# Patient Record
Sex: FEMALE | Race: WHITE
[De-identification: names, ages, dates, MRNs, and addresses within clinical notes are randomized per-mention and may not be internally consistent; named-entity substitution may affect disease eponyms.]

---

## 2018-06-05 ENCOUNTER — HOSPITAL ENCOUNTER (EMERGENCY)
Dept: HOSPITAL 82 - ED | Age: 73
Discharge: HOME | End: 2018-06-05
Payer: MEDICARE

## 2018-06-05 VITALS — DIASTOLIC BLOOD PRESSURE: 74 MMHG | SYSTOLIC BLOOD PRESSURE: 123 MMHG

## 2018-06-05 VITALS — WEIGHT: 226.19 LBS | BODY MASS INDEX: 38.62 KG/M2 | HEIGHT: 64 IN
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
## 2018-06-05 DIAGNOSIS — S80.01XA: Primary | ICD-10-CM

## 2018-06-05 DIAGNOSIS — E83.42: ICD-10-CM
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
## 2018-06-05 DIAGNOSIS — S40.011A: ICD-10-CM

## 2018-10-04 ENCOUNTER — HOSPITAL ENCOUNTER (OUTPATIENT)
Dept: HOSPITAL 82 - INF | Age: 73
Discharge: HOME | End: 2018-10-04
Attending: INTERNAL MEDICINE
Payer: MEDICARE

## 2018-10-04 DIAGNOSIS — E83.42: Primary | ICD-10-CM

## 2018-10-04 LAB
MAGNESIUM SERPL-MCNC: 1.4 MG/DL (ref 1.6–2.3)
POTASSIUM SERPL-SCNC: 4.2 MMOL/L (ref 3.5–5.1)

## 2019-02-21 ENCOUNTER — HOSPITAL ENCOUNTER (OUTPATIENT)
Dept: HOSPITAL 82 - INF | Age: 74
Discharge: HOME | End: 2019-02-21
Attending: INTERNAL MEDICINE
Payer: MEDICARE

## 2019-02-21 DIAGNOSIS — E83.42: Primary | ICD-10-CM

## 2019-02-21 LAB
MAGNESIUM SERPL-MCNC: 1.3 MG/DL (ref 1.6–2.3)
POTASSIUM SERPL-SCNC: 3.6 MMOL/L (ref 3.5–5.1)

## 2019-02-25 ENCOUNTER — HOSPITAL ENCOUNTER (OUTPATIENT)
Dept: HOSPITAL 82 - INF | Age: 74
Discharge: HOME | End: 2019-02-25
Attending: NURSE PRACTITIONER
Payer: MEDICARE

## 2019-02-25 DIAGNOSIS — E87.6: ICD-10-CM

## 2019-02-25 DIAGNOSIS — E83.42: Primary | ICD-10-CM

## 2019-02-25 LAB
ANION GAP SERPL CALCULATED.3IONS-SCNC: 14 MMOL/L
BUN SERPL-MCNC: 14 MG/DL (ref 8–23)
BUN/CREAT SERPL: 11
CHLORIDE SERPL-SCNC: 104 MMOL/L (ref 95–108)
CO2 SERPL-SCNC: 26 MMOL/L (ref 22–30)
CREAT SERPL-MCNC: 1.3 MG/DL (ref 0.5–1)
MAGNESIUM SERPL-MCNC: 1.4 MG/DL (ref 1.6–2.3)
POTASSIUM SERPL-SCNC: 3.8 MMOL/L (ref 3.5–5.1)
SODIUM SERPL-SCNC: 140 MMOL/L (ref 137–146)

## 2019-02-27 ENCOUNTER — HOSPITAL ENCOUNTER (OUTPATIENT)
Dept: HOSPITAL 82 - DI | Age: 74
Discharge: HOME | End: 2019-02-27
Attending: NURSE PRACTITIONER
Payer: MEDICARE

## 2019-02-27 DIAGNOSIS — R05: Primary | ICD-10-CM

## 2019-02-27 DIAGNOSIS — R06.2: ICD-10-CM

## 2019-03-04 ENCOUNTER — HOSPITAL ENCOUNTER (OUTPATIENT)
Dept: HOSPITAL 82 - LAB | Age: 74
Discharge: HOME | End: 2019-03-04
Attending: INTERNAL MEDICINE
Payer: MEDICARE

## 2019-03-04 DIAGNOSIS — E83.42: ICD-10-CM

## 2019-03-04 DIAGNOSIS — N18.2: Primary | ICD-10-CM

## 2019-04-17 ENCOUNTER — HOSPITAL ENCOUNTER (OUTPATIENT)
Dept: HOSPITAL 82 - ENDO | Age: 74
Discharge: HOME | End: 2019-04-17
Attending: SURGERY
Payer: MEDICARE

## 2019-04-17 VITALS — SYSTOLIC BLOOD PRESSURE: 108 MMHG | DIASTOLIC BLOOD PRESSURE: 57 MMHG

## 2019-04-17 DIAGNOSIS — Z12.11: Primary | ICD-10-CM

## 2019-04-17 DIAGNOSIS — K64.4: ICD-10-CM

## 2019-04-17 DIAGNOSIS — K56.609: ICD-10-CM

## 2019-04-17 PROCEDURE — 0DJD8ZZ INSPECTION OF LOWER INTESTINAL TRACT, VIA NATURAL OR ARTIFICIAL OPENING ENDOSCOPIC: ICD-10-PCS | Performed by: SURGERY

## 2020-05-26 ENCOUNTER — HOSPITAL ENCOUNTER (EMERGENCY)
Dept: HOSPITAL 82 - ED | Age: 75
Discharge: HOME | End: 2020-05-26
Payer: MEDICARE

## 2020-05-26 VITALS — SYSTOLIC BLOOD PRESSURE: 135 MMHG | DIASTOLIC BLOOD PRESSURE: 71 MMHG

## 2020-05-26 DIAGNOSIS — I10: ICD-10-CM

## 2020-05-26 DIAGNOSIS — M25.532: ICD-10-CM

## 2020-05-26 DIAGNOSIS — M79.642: Primary | ICD-10-CM

## 2020-05-26 DIAGNOSIS — M79.89: ICD-10-CM

## 2022-06-03 ENCOUNTER — HOSPITAL ENCOUNTER (OUTPATIENT)
Dept: HOSPITAL 82 - ED | Age: 77
Setting detail: OBSERVATION
LOS: 2 days | Discharge: HOME | End: 2022-06-05
Attending: INTERNAL MEDICINE | Admitting: INTERNAL MEDICINE
Payer: MEDICARE

## 2022-06-03 VITALS — DIASTOLIC BLOOD PRESSURE: 64 MMHG | SYSTOLIC BLOOD PRESSURE: 136 MMHG

## 2022-06-03 VITALS — SYSTOLIC BLOOD PRESSURE: 98 MMHG | DIASTOLIC BLOOD PRESSURE: 49 MMHG

## 2022-06-03 VITALS — HEIGHT: 64 IN | WEIGHT: 194.01 LBS | BODY MASS INDEX: 33.12 KG/M2

## 2022-06-03 VITALS — DIASTOLIC BLOOD PRESSURE: 66 MMHG | SYSTOLIC BLOOD PRESSURE: 136 MMHG

## 2022-06-03 VITALS — SYSTOLIC BLOOD PRESSURE: 149 MMHG | DIASTOLIC BLOOD PRESSURE: 60 MMHG

## 2022-06-03 VITALS — SYSTOLIC BLOOD PRESSURE: 136 MMHG | DIASTOLIC BLOOD PRESSURE: 66 MMHG

## 2022-06-03 VITALS — DIASTOLIC BLOOD PRESSURE: 57 MMHG | SYSTOLIC BLOOD PRESSURE: 124 MMHG

## 2022-06-03 DIAGNOSIS — D68.32: ICD-10-CM

## 2022-06-03 DIAGNOSIS — Z86.73: ICD-10-CM

## 2022-06-03 DIAGNOSIS — F32.A: ICD-10-CM

## 2022-06-03 DIAGNOSIS — Z86.718: ICD-10-CM

## 2022-06-03 DIAGNOSIS — T45.515A: ICD-10-CM

## 2022-06-03 DIAGNOSIS — I10: ICD-10-CM

## 2022-06-03 DIAGNOSIS — K21.9: ICD-10-CM

## 2022-06-03 DIAGNOSIS — Z86.711: ICD-10-CM

## 2022-06-03 DIAGNOSIS — R31.9: Primary | ICD-10-CM

## 2022-06-03 DIAGNOSIS — Z20.822: ICD-10-CM

## 2022-06-03 LAB
ALBUMIN SERPL-MCNC: 3.5 G/DL (ref 3.2–5)
ALP SERPL-CCNC: 108 U/L (ref 38–126)
ANION GAP SERPL CALCULATED.3IONS-SCNC: 13 MMOL/L
AST SERPL-CCNC: 29 U/L (ref 9–36)
BASOPHILS NFR BLD AUTO: 1 % (ref 0–3)
BILIRUB UR QL STRIP.AUTO: NEGATIVE
BUN SERPL-MCNC: 17 MG/DL (ref 8–23)
BUN/CREAT SERPL: 16
CHLORIDE SERPL-SCNC: 110 MMOL/L (ref 95–108)
CO2 SERPL-SCNC: 22 MMOL/L (ref 22–30)
COARSE GRAN CASTS URNS QL MICRO: (no result) LPF
COLOR UR AUTO: (no result)
CREAT SERPL-MCNC: 1 MG/DL (ref 0.5–1)
EOSINOPHIL NFR BLD AUTO: 8 % (ref 0–8)
ERYTHROCYTE [DISTWIDTH] IN BLOOD BY AUTOMATED COUNT: 13.3 % (ref 11.5–15.5)
GLUCOSE UR STRIP.AUTO-MCNC: NEGATIVE MG/DL
HCT VFR BLD AUTO: 37.5 % (ref 37–47)
HGB BLD-MCNC: 12.1 G/DL (ref 12–16)
HGB UR QL STRIP.AUTO: (no result)
IMM GRANULOCYTES NFR BLD: 0 % (ref 0–5)
INR PPP: 4 RATIO (ref 0.7–1.3)
KETONES UR STRIP.AUTO-MCNC: NEGATIVE MG/DL
LEUKOCYTE ESTERASE UR QL STRIP.AUTO: NEGATIVE
LYMPHOCYTES NFR BLD: 22 % (ref 15–41)
MCH RBC QN AUTO: 32.9 PG  CALC (ref 26–32)
MCHC RBC AUTO-ENTMCNC: 32.3 G/DL CAL (ref 32–36)
MCV RBC AUTO: 101.9 FL  CALC (ref 80–100)
MONOCYTES NFR BLD AUTO: 7 % (ref 2–13)
NEUTROPHILS # BLD AUTO: 3.09 THOU/UL (ref 2–7.15)
NEUTROPHILS NFR BLD AUTO: 62 % (ref 42–76)
NITRITE UR QL STRIP.AUTO: NEGATIVE
PH UR STRIP.AUTO: 6 [PH] (ref 4.5–8)
PLATELET # BLD AUTO: 185 THOU/UL (ref 130–400)
POTASSIUM SERPL-SCNC: 3.5 MMOL/L (ref 3.5–5.1)
PROT SERPL-MCNC: 6.9 G/DL (ref 6.3–8.2)
PROT UR QL STRIP.AUTO: >=300 MG/DL
PROTHROMBIN TIME: 38.6 SECONDS (ref 9–12.5)
RBC # BLD AUTO: 3.68 MILL/UL (ref 4.2–5.6)
RBC #/AREA URNS HPF: >100 RBC/HPF (ref 0–5)
SODIUM SERPL-SCNC: 141 MMOL/L (ref 137–146)
SP GR UR STRIP.AUTO: >=1.03
UROBILINOGEN UR QL STRIP.AUTO: 0.2 E.U./DL
WBC #/AREA URNS HPF: (no result) WBC/HPF (ref 0–5)

## 2022-06-03 PROCEDURE — G0378 HOSPITAL OBSERVATION PER HR: HCPCS

## 2022-06-04 VITALS — DIASTOLIC BLOOD PRESSURE: 44 MMHG | SYSTOLIC BLOOD PRESSURE: 105 MMHG

## 2022-06-04 VITALS — DIASTOLIC BLOOD PRESSURE: 42 MMHG | SYSTOLIC BLOOD PRESSURE: 102 MMHG

## 2022-06-04 VITALS — SYSTOLIC BLOOD PRESSURE: 93 MMHG | DIASTOLIC BLOOD PRESSURE: 44 MMHG

## 2022-06-04 VITALS — DIASTOLIC BLOOD PRESSURE: 53 MMHG | SYSTOLIC BLOOD PRESSURE: 110 MMHG

## 2022-06-04 VITALS — SYSTOLIC BLOOD PRESSURE: 96 MMHG | DIASTOLIC BLOOD PRESSURE: 45 MMHG

## 2022-06-04 VITALS — DIASTOLIC BLOOD PRESSURE: 53 MMHG | SYSTOLIC BLOOD PRESSURE: 111 MMHG

## 2022-06-04 LAB
ANION GAP SERPL CALCULATED.3IONS-SCNC: 9 MMOL/L
BUN SERPL-MCNC: 17 MG/DL (ref 8–23)
BUN/CREAT SERPL: 20
CHLORIDE SERPL-SCNC: 112 MMOL/L (ref 95–108)
CO2 SERPL-SCNC: 24 MMOL/L (ref 22–30)
CREAT SERPL-MCNC: 0.9 MG/DL (ref 0.5–1)
ERYTHROCYTE [DISTWIDTH] IN BLOOD BY AUTOMATED COUNT: 13.4 % (ref 11.5–15.5)
HCT VFR BLD AUTO: 32.4 % (ref 37–47)
HGB BLD-MCNC: 10.7 G/DL (ref 12–16)
INR PPP: 3.2 RATIO (ref 0.7–1.3)
MCH RBC QN AUTO: 33.9 PG  CALC (ref 26–32)
MCHC RBC AUTO-ENTMCNC: 33 G/DL CAL (ref 32–36)
MCV RBC AUTO: 102.5 FL  CALC (ref 80–100)
PLATELET # BLD AUTO: 131 THOU/UL (ref 130–400)
POTASSIUM SERPL-SCNC: 3.6 MMOL/L (ref 3.5–5.1)
PROTHROMBIN TIME: 31.4 SECONDS (ref 9–12.5)
RBC # BLD AUTO: 3.16 MILL/UL (ref 4.2–5.6)
SODIUM SERPL-SCNC: 141 MMOL/L (ref 137–146)

## 2022-06-05 VITALS — DIASTOLIC BLOOD PRESSURE: 45 MMHG | SYSTOLIC BLOOD PRESSURE: 91 MMHG

## 2022-06-05 VITALS — SYSTOLIC BLOOD PRESSURE: 92 MMHG | DIASTOLIC BLOOD PRESSURE: 37 MMHG

## 2022-06-05 VITALS — DIASTOLIC BLOOD PRESSURE: 60 MMHG | SYSTOLIC BLOOD PRESSURE: 119 MMHG

## 2022-06-05 VITALS — SYSTOLIC BLOOD PRESSURE: 109 MMHG | DIASTOLIC BLOOD PRESSURE: 52 MMHG

## 2022-06-05 LAB
ALBUMIN SERPL-MCNC: 2.4 G/DL (ref 3.2–5)
ALP SERPL-CCNC: 89 U/L (ref 38–126)
ANION GAP SERPL CALCULATED.3IONS-SCNC: 9 MMOL/L
AST SERPL-CCNC: 22 U/L (ref 9–36)
BASOPHILS NFR BLD AUTO: 1 % (ref 0–3)
BUN SERPL-MCNC: 16 MG/DL (ref 8–23)
BUN/CREAT SERPL: 19
CHLORIDE SERPL-SCNC: 113 MMOL/L (ref 95–108)
CO2 SERPL-SCNC: 22 MMOL/L (ref 22–30)
CREAT SERPL-MCNC: 0.9 MG/DL (ref 0.5–1)
EOSINOPHIL NFR BLD AUTO: 11 % (ref 0–8)
ERYTHROCYTE [DISTWIDTH] IN BLOOD BY AUTOMATED COUNT: 13.5 % (ref 11.5–15.5)
HCT VFR BLD AUTO: 30.1 % (ref 37–47)
HGB BLD-MCNC: 9.5 G/DL (ref 12–16)
IMM GRANULOCYTES NFR BLD: 0 % (ref 0–5)
INR PPP: 2 RATIO (ref 0.7–1.3)
INR PPP: 2.2 RATIO (ref 0.7–1.3)
LYMPHOCYTES NFR BLD: 30 % (ref 15–41)
MCH RBC QN AUTO: 33 PG  CALC (ref 26–32)
MCHC RBC AUTO-ENTMCNC: 31.6 G/DL CAL (ref 32–36)
MCV RBC AUTO: 104.5 FL  CALC (ref 80–100)
MONOCYTES NFR BLD AUTO: 11 % (ref 2–13)
NEUTROPHILS # BLD AUTO: 1.63 THOU/UL (ref 2–7.15)
NEUTROPHILS NFR BLD AUTO: 48 % (ref 42–76)
PLATELET # BLD AUTO: 121 THOU/UL (ref 130–400)
POTASSIUM SERPL-SCNC: 3.6 MMOL/L (ref 3.5–5.1)
PROT SERPL-MCNC: 5.2 G/DL (ref 6.3–8.2)
PROTHROMBIN TIME: 20.3 SECONDS (ref 9–12.5)
PROTHROMBIN TIME: 21.9 SECONDS (ref 9–12.5)
RBC # BLD AUTO: 2.88 MILL/UL (ref 4.2–5.6)
SODIUM SERPL-SCNC: 141 MMOL/L (ref 137–146)

## 2022-11-23 ENCOUNTER — HOSPITAL ENCOUNTER (INPATIENT)
Dept: HOSPITAL 82 - ED | Age: 77
LOS: 12 days | Discharge: NURSING, REHAB OR LONG-TERM CARE | DRG: 155 | End: 2022-12-05
Attending: INTERNAL MEDICINE | Admitting: INTERNAL MEDICINE
Payer: MEDICARE

## 2022-11-23 VITALS — DIASTOLIC BLOOD PRESSURE: 51 MMHG | SYSTOLIC BLOOD PRESSURE: 102 MMHG

## 2022-11-23 VITALS — SYSTOLIC BLOOD PRESSURE: 103 MMHG | DIASTOLIC BLOOD PRESSURE: 82 MMHG

## 2022-11-23 VITALS — DIASTOLIC BLOOD PRESSURE: 41 MMHG | SYSTOLIC BLOOD PRESSURE: 88 MMHG

## 2022-11-23 VITALS — DIASTOLIC BLOOD PRESSURE: 59 MMHG | SYSTOLIC BLOOD PRESSURE: 115 MMHG

## 2022-11-23 VITALS — SYSTOLIC BLOOD PRESSURE: 117 MMHG | DIASTOLIC BLOOD PRESSURE: 52 MMHG

## 2022-11-23 VITALS — SYSTOLIC BLOOD PRESSURE: 143 MMHG | DIASTOLIC BLOOD PRESSURE: 73 MMHG

## 2022-11-23 VITALS — SYSTOLIC BLOOD PRESSURE: 116 MMHG | DIASTOLIC BLOOD PRESSURE: 56 MMHG

## 2022-11-23 VITALS — BODY MASS INDEX: 31.62 KG/M2 | WEIGHT: 185.19 LBS | HEIGHT: 64 IN

## 2022-11-23 DIAGNOSIS — R79.1: ICD-10-CM

## 2022-11-23 DIAGNOSIS — B95.61: ICD-10-CM

## 2022-11-23 DIAGNOSIS — K11.21: Primary | ICD-10-CM

## 2022-11-23 DIAGNOSIS — E87.20: ICD-10-CM

## 2022-11-23 DIAGNOSIS — Z86.73: ICD-10-CM

## 2022-11-23 DIAGNOSIS — L03.211: ICD-10-CM

## 2022-11-23 DIAGNOSIS — Z79.01: ICD-10-CM

## 2022-11-23 DIAGNOSIS — I10: ICD-10-CM

## 2022-11-23 DIAGNOSIS — R78.81: ICD-10-CM

## 2022-11-23 DIAGNOSIS — M19.90: ICD-10-CM

## 2022-11-23 DIAGNOSIS — Z86.718: ICD-10-CM

## 2022-11-23 DIAGNOSIS — Z86.711: ICD-10-CM

## 2022-11-23 DIAGNOSIS — K21.9: ICD-10-CM

## 2022-11-23 DIAGNOSIS — T45.515A: ICD-10-CM

## 2022-11-23 DIAGNOSIS — F32.A: ICD-10-CM

## 2022-11-23 LAB
ALBUMIN SERPL-MCNC: 2.8 G/DL (ref 3.2–5)
ALP SERPL-CCNC: 159 U/L (ref 38–126)
ANION GAP SERPL CALCULATED.3IONS-SCNC: 10 MMOL/L
AST SERPL-CCNC: 38 U/L (ref 9–36)
BASOPHILS NFR BLD AUTO: 0 % (ref 0–3)
BUN SERPL-MCNC: 12 MG/DL (ref 8–23)
BUN/CREAT SERPL: 12
CHLORIDE SERPL-SCNC: 104 MMOL/L (ref 95–108)
CO2 SERPL-SCNC: 25 MMOL/L (ref 22–30)
CREAT SERPL-MCNC: 1 MG/DL (ref 0.5–1)
EOSINOPHIL NFR BLD AUTO: 0 % (ref 0–8)
ERYTHROCYTE [DISTWIDTH] IN BLOOD BY AUTOMATED COUNT: 15 % (ref 11.5–15.5)
HCT VFR BLD AUTO: 31.7 % (ref 37–47)
HGB BLD-MCNC: 10.7 G/DL (ref 12–16)
IMM GRANULOCYTES NFR BLD: 0.3 % (ref 0–5)
LYMPHOCYTES NFR BLD: 5 % (ref 15–41)
MCH RBC QN AUTO: 33.4 PG  CALC (ref 26–32)
MCHC RBC AUTO-ENTMCNC: 33.8 G/DL CAL (ref 32–36)
MCV RBC AUTO: 99.1 FL  CALC (ref 80–100)
MONOCYTES NFR BLD AUTO: 10 % (ref 2–13)
NEUTROPHILS # BLD AUTO: 12.35 THOU/UL (ref 2–7.15)
NEUTROPHILS NFR BLD AUTO: 85 % (ref 42–76)
PLATELET # BLD AUTO: 183 THOU/UL (ref 130–400)
POTASSIUM SERPL-SCNC: 3.4 MMOL/L (ref 3.5–5.1)
PROT SERPL-MCNC: 6.7 G/DL (ref 6.3–8.2)
RBC # BLD AUTO: 3.2 MILL/UL (ref 4.2–5.6)
SODIUM SERPL-SCNC: 136 MMOL/L (ref 137–146)

## 2022-11-23 PROCEDURE — G0378 HOSPITAL OBSERVATION PER HR: HCPCS

## 2022-11-23 NOTE — NUR
PT BROUGHT TO [facility] IN STABLE CONDITION, NOT WEARING O2. BREATHING IS EVEN AND
NON-LABORED, NO C/O SOB OR BREATHING DIFFICULTIES. PT ABLE TO AMBULATE WITH
SUPERVISION, MINIMUM ASISTANCE.  PT DENIES ANY PAIN OR DISCOMFORT. IV SITE
FLUSHED, PATENT. PT ORIEBTATED TO ROOM, CALL SYSTEM AND ENCOURAGED TO USE CALL
LIGHT. TELE MONITOR IN PLACE. PT DENIES ANY NEEDS AT THIS TIME. ALL SAFETY
PRECAUTIONS IN PLACE AT THIS TIME.

## 2022-11-24 VITALS — DIASTOLIC BLOOD PRESSURE: 53 MMHG | SYSTOLIC BLOOD PRESSURE: 106 MMHG

## 2022-11-24 VITALS — DIASTOLIC BLOOD PRESSURE: 50 MMHG | SYSTOLIC BLOOD PRESSURE: 100 MMHG

## 2022-11-24 VITALS — SYSTOLIC BLOOD PRESSURE: 90 MMHG | DIASTOLIC BLOOD PRESSURE: 50 MMHG

## 2022-11-24 VITALS — DIASTOLIC BLOOD PRESSURE: 50 MMHG | SYSTOLIC BLOOD PRESSURE: 95 MMHG

## 2022-11-24 VITALS — SYSTOLIC BLOOD PRESSURE: 83 MMHG | DIASTOLIC BLOOD PRESSURE: 42 MMHG

## 2022-11-24 VITALS — SYSTOLIC BLOOD PRESSURE: 100 MMHG | DIASTOLIC BLOOD PRESSURE: 50 MMHG

## 2022-11-24 VITALS — DIASTOLIC BLOOD PRESSURE: 61 MMHG | SYSTOLIC BLOOD PRESSURE: 111 MMHG

## 2022-11-24 LAB
ANION GAP SERPL CALCULATED.3IONS-SCNC: 13 MMOL/L
BILIRUB UR QL STRIP.AUTO: NEGATIVE
BUN SERPL-MCNC: 13 MG/DL (ref 8–23)
BUN/CREAT SERPL: 13
CHLORIDE SERPL-SCNC: 108 MMOL/L (ref 95–108)
CO2 SERPL-SCNC: 23 MMOL/L (ref 22–30)
COLOR UR AUTO: (no result)
CREAT SERPL-MCNC: 1 MG/DL (ref 0.5–1)
ERYTHROCYTE [DISTWIDTH] IN BLOOD BY AUTOMATED COUNT: 14.8 % (ref 11.5–15.5)
GLUCOSE UR STRIP.AUTO-MCNC: NEGATIVE MG/DL
HCT VFR BLD AUTO: 30.7 % (ref 37–47)
HGB BLD-MCNC: 10.2 G/DL (ref 12–16)
HGB UR QL STRIP.AUTO: NEGATIVE
INR PPP: 4.5 RATIO (ref 0.7–1.3)
KETONES UR STRIP.AUTO-MCNC: (no result) MG/DL
LEUKOCYTE ESTERASE UR QL STRIP.AUTO: NEGATIVE
MCH RBC QN AUTO: 33 PG  CALC (ref 26–32)
MCHC RBC AUTO-ENTMCNC: 33.2 G/DL CAL (ref 32–36)
MCV RBC AUTO: 99.4 FL  CALC (ref 80–100)
NITRITE UR QL STRIP.AUTO: NEGATIVE
PH UR STRIP.AUTO: 6 [PH] (ref 4.5–8)
PLATELET # BLD AUTO: 137 THOU/UL (ref 130–400)
POTASSIUM SERPL-SCNC: 3.5 MMOL/L (ref 3.5–5.1)
PROT UR QL STRIP.AUTO: 30 MG/DL
PROTHROMBIN TIME: 41.7 SECONDS (ref 9–12.5)
RBC # BLD AUTO: 3.09 MILL/UL (ref 4.2–5.6)
RBC #/AREA URNS HPF: (no result) RBC/HPF (ref 0–5)
SODIUM SERPL-SCNC: 140 MMOL/L (ref 137–146)
SP GR UR STRIP.AUTO: 1.02
SQUAMOUS URNS QL MICRO: (no result) EPI/HPF
UROBILINOGEN UR QL STRIP.AUTO: 0.2 E.U./DL
WBC #/AREA URNS HPF: (no result) WBC/HPF (ref 0–5)

## 2022-11-24 NOTE — NUR
PATIENT RESTING QUIETLY IN BED. ALERT AND ORIENTED X3. ASSESSMENT COMPLETE, PM
MEDS ADMINISTERED. C/O MILD PAIN AS 2/10, DECLINES OFFER OF TYLENOL. CALL
LIGHT WITHIN REACH, INSTRUCTED TO CALL FOR ASSISTANCE.

## 2022-11-24 NOTE — NUR
PT SLEEPING IN BED EASILY AROUSIBLE. PT SLIGHTLY CONFUSED WHEN WOKEN UP BUT
EASILY REORIENTATED. PT BREATHING EVEN AND NON LABORED. IV SITE PATENT. PT
DENIES NEEDING TO USE RESTROOM, NEEDING A DRINK/SNACK AND DENIES PAIN. DENIES
ANY FURTHER NEEDS. ALL SAFETY PRECAUTIONS IN PLACE AT THIS TIME

## 2022-11-24 NOTE — NUR
PT IN ROOM SLEEPING, AROUSED TO TOUCH. PT BREATHING REMAINS THE SAME. PT
DENIES ANY NEEDS AT THIS TIME. TELE MONITOR IN PLACE. ALL SAFETY PRECAUTIONS
IN PLACE AT THIS TIME.

## 2022-11-24 NOTE — NUR
BEDSIDE SHIFT REPORT, PT AWAKE ALERT AND ORIENTED RESTING IN BED, RIGHT FACE
SWOLEN AND HARD, C/O MILD DISCOMFORT AT THIS TIME TO AREA, TELE MONITOR IN
PLACE, CALL BELL IN REACH AND BED LOCKED IN LOWEST POSITION.

## 2022-11-24 NOTE — NUR
HIGH LABS RECIEVED FROM [facility] IN LAB, INR 4.5 AND PT 41.7. INFORMED ON CALL
PHYSICAN AND WAS TOLD TO HOLD WARFARIN. WILL PASS ON IN REPORT.

## 2022-11-24 NOTE — NUR
S:  MILLA LAY is a 77 F who presents with
CELLULITIS .
 
She has a history of SSTI. All medications in patient's
chart were reviewed.
 
O:
 
VS: BP 90/50, P 76, RR 17,T 98.1
 
W 84KG, HT 64IN,CrCl= 64ml/min
 
A:
 
Blood culture is pending
 
Urine culture is pending
 
P:
 
Vancomycin ordered for pharmacy to dose.
 
Start Vancomycin 1 GRAM IV Q12H. Vancomycin trough is drawn before the 4th
dose on 11/25/22 @0800.
 
Vancomycin goal trough is between <15-20 mcg/ml>.
 
Pharmacy will follow and or advise on antibiotics use as needed.
 
 
NELLY FONTANAD

## 2022-11-24 NOTE — NUR
CHECKED ON PT DUE TO LOW BLOOD PRESSURE. PT EASILY ARROUSED, SPEECH CLEAR,
A/OX3. PT DID STAY AWAKE FOR SMALL CONVERSATION. PT DENIES ANY ODD FEELING OR
PAIN. PT DENIES ANY  NEEDS AT THIS TIME.

## 2022-11-25 VITALS — SYSTOLIC BLOOD PRESSURE: 100 MMHG | DIASTOLIC BLOOD PRESSURE: 43 MMHG

## 2022-11-25 VITALS — SYSTOLIC BLOOD PRESSURE: 94 MMHG | DIASTOLIC BLOOD PRESSURE: 43 MMHG

## 2022-11-25 VITALS — DIASTOLIC BLOOD PRESSURE: 54 MMHG | SYSTOLIC BLOOD PRESSURE: 110 MMHG

## 2022-11-25 VITALS — SYSTOLIC BLOOD PRESSURE: 99 MMHG | DIASTOLIC BLOOD PRESSURE: 51 MMHG

## 2022-11-25 VITALS — DIASTOLIC BLOOD PRESSURE: 52 MMHG | SYSTOLIC BLOOD PRESSURE: 104 MMHG

## 2022-11-25 VITALS — SYSTOLIC BLOOD PRESSURE: 90 MMHG | DIASTOLIC BLOOD PRESSURE: 51 MMHG

## 2022-11-25 VITALS — DIASTOLIC BLOOD PRESSURE: 51 MMHG | SYSTOLIC BLOOD PRESSURE: 97 MMHG

## 2022-11-25 VITALS — SYSTOLIC BLOOD PRESSURE: 110 MMHG | DIASTOLIC BLOOD PRESSURE: 54 MMHG

## 2022-11-25 LAB
ANION GAP SERPL CALCULATED.3IONS-SCNC: 8 MMOL/L
BUN SERPL-MCNC: 13 MG/DL (ref 8–23)
BUN/CREAT SERPL: 14
CHLORIDE SERPL-SCNC: 111 MMOL/L (ref 95–108)
CO2 SERPL-SCNC: 22 MMOL/L (ref 22–30)
CREAT SERPL-MCNC: 0.9 MG/DL (ref 0.5–1)
ERYTHROCYTE [DISTWIDTH] IN BLOOD BY AUTOMATED COUNT: 15.1 % (ref 11.5–15.5)
HCT VFR BLD AUTO: 26.4 % (ref 37–47)
HGB BLD-MCNC: 8.8 G/DL (ref 12–16)
INR PPP: 4.4 RATIO (ref 0.7–1.3)
MAGNESIUM SERPL-MCNC: 0.9 MG/DL (ref 1.6–2.3)
MCH RBC QN AUTO: 32.8 PG  CALC (ref 26–32)
MCHC RBC AUTO-ENTMCNC: 33.3 G/DL CAL (ref 32–36)
MCV RBC AUTO: 98.5 FL  CALC (ref 80–100)
PLATELET # BLD AUTO: 119 THOU/UL (ref 130–400)
POTASSIUM SERPL-SCNC: 3.2 MMOL/L (ref 3.5–5.1)
PROTHROMBIN TIME: 41.2 SECONDS (ref 9–12.5)
RBC # BLD AUTO: 2.68 MILL/UL (ref 4.2–5.6)
SODIUM SERPL-SCNC: 137 MMOL/L (ref 137–146)

## 2022-11-25 NOTE — NUR
BEDSIDE REPORT GIVEN, PT SLEEPING BUT AROUSES TO VERBAL STIMULI, C/O MILD PAIN
TO RIGHT FACE, TELE MONITORIN PLACE, CALL CORONA IN REACH AND BED LOCKED IN
LOWEST POSITION.

## 2022-11-25 NOTE — NUR
PRELIMINARY BC RESULTS SHOW GRAM (+) COCCI IN 1/4 VIALS. RESULTS REPORTED TO
DR MATTHEWS. PATIENT CURRENTLY RECEIVING VANCOMYCIN 1GM IV Q12H. NO NEW ORDERS.

## 2022-11-25 NOTE — NUR
S:  MILLA LAY is a 77 F who presents with CELLUITIS OF FACE
 
She has a history of HYPERTENSION, GERD, DEPRESSION, HYPOKALEMIA, AND
HYPOMAGNESIA.
 
All medications in patient's
chart were reviewed.
 
O:
 
VS: BP 99/51mmHg, P 75bpm, RR 18bpm,T 96.5F
 
W 84kg, HT 64inches, Scr=0.9mg/dL,CrCl= 51ml/min
 
A:
 
Blood culture (preliminary) shows 1/4 gram positive cocci.
 
P:
 
Patient is on ZOYSN 3.375g IV Q6H.
 
Vancomycin ordered for pharmacy to dose.
 
CONTINUE Vancomycin 1g IV Q12H. Vancomycin trough is drawn before the 4th dose
on 11/26/2022 @ 20:00.
 
Vancomycin goal trough is between <10-15 mcg/ml>.
 
Pharmacy will follow and or advise on antibiotics use as needed.

## 2022-11-26 VITALS — DIASTOLIC BLOOD PRESSURE: 55 MMHG | SYSTOLIC BLOOD PRESSURE: 111 MMHG

## 2022-11-26 VITALS — DIASTOLIC BLOOD PRESSURE: 43 MMHG | SYSTOLIC BLOOD PRESSURE: 97 MMHG

## 2022-11-26 VITALS — DIASTOLIC BLOOD PRESSURE: 53 MMHG | SYSTOLIC BLOOD PRESSURE: 102 MMHG

## 2022-11-26 VITALS — DIASTOLIC BLOOD PRESSURE: 61 MMHG | SYSTOLIC BLOOD PRESSURE: 127 MMHG

## 2022-11-26 VITALS — SYSTOLIC BLOOD PRESSURE: 92 MMHG | DIASTOLIC BLOOD PRESSURE: 52 MMHG

## 2022-11-26 LAB
ANION GAP SERPL CALCULATED.3IONS-SCNC: 7 MMOL/L
BUN SERPL-MCNC: 13 MG/DL (ref 8–23)
BUN/CREAT SERPL: 14
CHLORIDE SERPL-SCNC: 110 MMOL/L (ref 95–108)
CO2 SERPL-SCNC: 25 MMOL/L (ref 22–30)
CREAT SERPL-MCNC: 0.9 MG/DL (ref 0.5–1)
ERYTHROCYTE [DISTWIDTH] IN BLOOD BY AUTOMATED COUNT: 15.3 % (ref 11.5–15.5)
HCT VFR BLD AUTO: 25.5 % (ref 37–47)
HGB BLD-MCNC: 8.6 G/DL (ref 12–16)
INR PPP: 3.5 RATIO (ref 0.7–1.3)
MAGNESIUM SERPL-MCNC: 1.7 MG/DL (ref 1.6–2.3)
MCH RBC QN AUTO: 33.2 PG  CALC (ref 26–32)
MCHC RBC AUTO-ENTMCNC: 33.7 G/DL CAL (ref 32–36)
MCV RBC AUTO: 98.5 FL  CALC (ref 80–100)
PLATELET # BLD AUTO: 135 THOU/UL (ref 130–400)
POTASSIUM SERPL-SCNC: 3.5 MMOL/L (ref 3.5–5.1)
PROTHROMBIN TIME: 33.2 SECONDS (ref 9–12.5)
RBC # BLD AUTO: 2.59 MILL/UL (ref 4.2–5.6)
SODIUM SERPL-SCNC: 138 MMOL/L (ref 137–146)

## 2022-11-26 NOTE — NUR
PT IN BED RESTING WATCHING TV BREATHING EVEN UNLABORED. NO S/S OF DISTRESS
NOTED. ASSESMENT COMPLETED. DENIES PAIN OR DISCOMFORT. CALL LIGHT IN REACH
AND BED IN LOWEST POSITION.

## 2022-11-26 NOTE — NUR
RECEIVED REPORT FROM OFF GOING NURSE. AWAKE IN BED. AM IV ABT INFUSION
COMPLETED. NO S/S OF ADVERSE REACTIONS NOTED.

## 2022-11-27 VITALS — DIASTOLIC BLOOD PRESSURE: 58 MMHG | SYSTOLIC BLOOD PRESSURE: 113 MMHG

## 2022-11-27 VITALS — SYSTOLIC BLOOD PRESSURE: 106 MMHG | DIASTOLIC BLOOD PRESSURE: 57 MMHG

## 2022-11-27 VITALS — DIASTOLIC BLOOD PRESSURE: 53 MMHG | SYSTOLIC BLOOD PRESSURE: 107 MMHG

## 2022-11-27 VITALS — DIASTOLIC BLOOD PRESSURE: 50 MMHG | SYSTOLIC BLOOD PRESSURE: 105 MMHG

## 2022-11-27 VITALS — DIASTOLIC BLOOD PRESSURE: 49 MMHG | SYSTOLIC BLOOD PRESSURE: 99 MMHG

## 2022-11-27 LAB
ANION GAP SERPL CALCULATED.3IONS-SCNC: 5 MMOL/L
BUN SERPL-MCNC: 11 MG/DL (ref 8–23)
BUN/CREAT SERPL: 12
CHLORIDE SERPL-SCNC: 110 MMOL/L (ref 95–108)
CO2 SERPL-SCNC: 26 MMOL/L (ref 22–30)
CREAT SERPL-MCNC: 0.9 MG/DL (ref 0.5–1)
ERYTHROCYTE [DISTWIDTH] IN BLOOD BY AUTOMATED COUNT: 15.2 % (ref 11.5–15.5)
HCT VFR BLD AUTO: 27.6 % (ref 37–47)
HGB BLD-MCNC: 9.2 G/DL (ref 12–16)
INR PPP: 3.3 RATIO (ref 0.7–1.3)
MAGNESIUM SERPL-MCNC: 1.5 MG/DL (ref 1.6–2.3)
MCH RBC QN AUTO: 33.3 PG  CALC (ref 26–32)
MCHC RBC AUTO-ENTMCNC: 33.3 G/DL CAL (ref 32–36)
MCV RBC AUTO: 100 FL  CALC (ref 80–100)
PLATELET # BLD AUTO: 144 THOU/UL (ref 130–400)
POTASSIUM SERPL-SCNC: 3.3 MMOL/L (ref 3.5–5.1)
PROTHROMBIN TIME: 31.4 SECONDS (ref 9–12.5)
RBC # BLD AUTO: 2.76 MILL/UL (ref 4.2–5.6)
SODIUM SERPL-SCNC: 138 MMOL/L (ref 137–146)

## 2022-11-27 NOTE — NUR
PT IN BED WATCHIN TV.NO S/S OF DISTRESS NOTED. DENIES PAIN AT THIS TIME.
ASSESMENT COMPPLETED. CALL LIGHT IN REACH AND BE DIN LOWEST POSITION.

## 2022-11-27 NOTE — NUR
PATIENT IS SITTING IN BED AND EATING LUNCH. PATIENT JUST FINISHED HAVING A
WASH DOWN.  PATIENT DENIES ANY DISTRESS. CALL LIGHT AND BEDSIDE TABLE WITHIN
REACH. ADVISED PATIENT TO CALL IF NEEDING ANYTHING. PATIENT VERBALIZED
UNDERSTANDING.

## 2022-11-27 NOTE — NUR
PT IN BED RESTING WITH EYES CLOSED BREATHING EVEN AND UNLABORED. NO S/S OF
DISTRESS NOTED. CALL LIGHT IN REACH AND BED IN LOWEST POSITION.

## 2022-11-27 NOTE — NUR
GOT REPORT FROM NIGHT SHIFT NURSE. PATIENT ASSESSED, PATIENT IS AOX3, IN BED
EATING BREAKFAST AND WATCHING TELEVISION. PATIENT DENIES ANY DISTRESS NOR
DISCOMFORT. PATIENT HAS CALL LIGHT AND BED SIDE TABLE WITH IN REACH. ADVISED
TO CALL IF SHE NEEDED ANYTHING. PATIENT VERBALIZED UNDERSTANDING.

## 2022-11-28 VITALS — SYSTOLIC BLOOD PRESSURE: 122 MMHG | DIASTOLIC BLOOD PRESSURE: 54 MMHG

## 2022-11-28 VITALS — DIASTOLIC BLOOD PRESSURE: 55 MMHG | SYSTOLIC BLOOD PRESSURE: 107 MMHG

## 2022-11-28 VITALS — DIASTOLIC BLOOD PRESSURE: 45 MMHG | SYSTOLIC BLOOD PRESSURE: 103 MMHG

## 2022-11-28 VITALS — SYSTOLIC BLOOD PRESSURE: 120 MMHG | DIASTOLIC BLOOD PRESSURE: 62 MMHG

## 2022-11-28 VITALS — SYSTOLIC BLOOD PRESSURE: 111 MMHG | DIASTOLIC BLOOD PRESSURE: 56 MMHG

## 2022-11-28 VITALS — SYSTOLIC BLOOD PRESSURE: 101 MMHG | DIASTOLIC BLOOD PRESSURE: 56 MMHG

## 2022-11-28 LAB
ANION GAP SERPL CALCULATED.3IONS-SCNC: 5 MMOL/L
BUN SERPL-MCNC: 10 MG/DL (ref 8–23)
BUN/CREAT SERPL: 12
CHLORIDE SERPL-SCNC: 111 MMOL/L (ref 95–108)
CO2 SERPL-SCNC: 25 MMOL/L (ref 22–30)
CREAT SERPL-MCNC: 0.8 MG/DL (ref 0.5–1)
ERYTHROCYTE [DISTWIDTH] IN BLOOD BY AUTOMATED COUNT: 15.2 % (ref 11.5–15.5)
HCT VFR BLD AUTO: 24.7 % (ref 37–47)
HGB BLD-MCNC: 8.2 G/DL (ref 12–16)
INR PPP: 4 RATIO (ref 0.7–1.3)
MAGNESIUM SERPL-MCNC: 1.6 MG/DL (ref 1.6–2.3)
MCH RBC QN AUTO: 33.2 PG  CALC (ref 26–32)
MCHC RBC AUTO-ENTMCNC: 33.2 G/DL CAL (ref 32–36)
MCV RBC AUTO: 100 FL  CALC (ref 80–100)
PLATELET # BLD AUTO: 135 THOU/UL (ref 130–400)
POTASSIUM SERPL-SCNC: 3.6 MMOL/L (ref 3.5–5.1)
PROTHROMBIN TIME: 37.6 SECONDS (ref 9–12.5)
RBC # BLD AUTO: 2.47 MILL/UL (ref 4.2–5.6)
SODIUM SERPL-SCNC: 137 MMOL/L (ref 137–146)

## 2022-11-28 NOTE — NUR
PT RESTING IN HIGH FOWLERS POSITION PT DENIES ADDITIONAL NEEDS AT THE TIME ALL
SAFETY PRECAUTIONS IN PLACE.

## 2022-11-28 NOTE — NUR
BEDSIDE SHIFT REPORT COMPLETE. PATIENT RESTING IN BED WATCHING TV. NO CONCERNS
EXPRESSED. CALL LIGHT WITHIN REACH.

## 2022-11-28 NOTE — NUR
PT IN BED RESTING WITH EYES CLOSED BREATHING EVEN AND UNLABORED. NO S/S OF
DISTRESS NOTED CALL LIGHT IN REACH AND BED IN LOWEST POSITION.

## 2022-11-28 NOTE — NUR
ASSESSMENT COMPLETE. PATIENT ALERT AND ORIENTED X3, VSS. NO DISTRESS NOTED.
DENIES PAIN AT THIS TIME. OFFERED ASSISTANCE TO BATHROOM DECLINED AT THIS
TIME. BED IN LOW POSITION, LOCKED. CALL LIGHT WITHIN REACH, INSTRUCTED TO CALL
FOR ASSISTANCE.

## 2022-11-29 VITALS — DIASTOLIC BLOOD PRESSURE: 42 MMHG | SYSTOLIC BLOOD PRESSURE: 92 MMHG

## 2022-11-29 VITALS — SYSTOLIC BLOOD PRESSURE: 102 MMHG | DIASTOLIC BLOOD PRESSURE: 46 MMHG

## 2022-11-29 VITALS — DIASTOLIC BLOOD PRESSURE: 45 MMHG | SYSTOLIC BLOOD PRESSURE: 101 MMHG

## 2022-11-29 VITALS — SYSTOLIC BLOOD PRESSURE: 98 MMHG | DIASTOLIC BLOOD PRESSURE: 54 MMHG

## 2022-11-29 VITALS — DIASTOLIC BLOOD PRESSURE: 46 MMHG | SYSTOLIC BLOOD PRESSURE: 100 MMHG

## 2022-11-29 VITALS — DIASTOLIC BLOOD PRESSURE: 55 MMHG | SYSTOLIC BLOOD PRESSURE: 125 MMHG

## 2022-11-29 VITALS — SYSTOLIC BLOOD PRESSURE: 110 MMHG | DIASTOLIC BLOOD PRESSURE: 53 MMHG

## 2022-11-29 LAB
INR PPP: 4 RATIO (ref 0.7–1.3)
PROTHROMBIN TIME: 37.6 SECONDS (ref 9–12.5)

## 2022-11-29 NOTE — NUR
PT RESTING IN SEMI FOWLERS POSITION.PT A/OX3 ASSESSMENT AND VS COMPLETED. PT
IV SITE NOTED TO RIGHT ARM. S.L PT STATED BM 11/28/22 AFTERNOON. PT DENIES
ADDITIONAL NEEDS AT THE TIME ALL SAFETY PRECAUTIONS IN PLACE WITH CALL LIGHT
INREACH.

## 2022-11-29 NOTE — NUR
PT RESTING IN SEMI FOWLERS POSITION. PT DENIES ADDITIONAL NEEDS AT THE TIME
ALL SAFETY PRECAUTIONS IN PLACE CALL LIGHT INREACH.

## 2022-11-29 NOTE — NUR
PT ALERT, ORIENTED X3, ABLE TO MAKE NEEDS KNOWN.  HR-RRR, PT DENIES PAIN,
CHEST PAIN, PRESSURE, NO EDEMA NOTED.
LUNG SOUNDS CLEAR, PT DENIES SOB, DIFFICULTY BREATHING.
 ABD SOFT, NONTENDER, BT PRESENT, PT DENIES NAUSEA. CMS INTACT. PT DECLINES
SCD. PT DECLINES OTHER NEEDS AT THIS TIME. PT
RESTING IN NO SIGN OF DISCOMFORT.

## 2022-11-30 VITALS — SYSTOLIC BLOOD PRESSURE: 91 MMHG | DIASTOLIC BLOOD PRESSURE: 41 MMHG

## 2022-11-30 VITALS — DIASTOLIC BLOOD PRESSURE: 42 MMHG | SYSTOLIC BLOOD PRESSURE: 90 MMHG

## 2022-11-30 VITALS — SYSTOLIC BLOOD PRESSURE: 114 MMHG | DIASTOLIC BLOOD PRESSURE: 62 MMHG

## 2022-11-30 VITALS — SYSTOLIC BLOOD PRESSURE: 96 MMHG | DIASTOLIC BLOOD PRESSURE: 48 MMHG

## 2022-11-30 VITALS — SYSTOLIC BLOOD PRESSURE: 90 MMHG | DIASTOLIC BLOOD PRESSURE: 54 MMHG

## 2022-11-30 VITALS — SYSTOLIC BLOOD PRESSURE: 105 MMHG | DIASTOLIC BLOOD PRESSURE: 61 MMHG

## 2022-11-30 VITALS — DIASTOLIC BLOOD PRESSURE: 61 MMHG | SYSTOLIC BLOOD PRESSURE: 105 MMHG

## 2022-11-30 VITALS — DIASTOLIC BLOOD PRESSURE: 43 MMHG | SYSTOLIC BLOOD PRESSURE: 93 MMHG

## 2022-11-30 VITALS — DIASTOLIC BLOOD PRESSURE: 54 MMHG | SYSTOLIC BLOOD PRESSURE: 90 MMHG

## 2022-11-30 VITALS — DIASTOLIC BLOOD PRESSURE: 42 MMHG | SYSTOLIC BLOOD PRESSURE: 99 MMHG

## 2022-11-30 LAB
ALBUMIN SERPL-MCNC: 2.1 G/DL (ref 3.2–5)
ALP SERPL-CCNC: 106 U/L (ref 38–126)
ANION GAP SERPL CALCULATED.3IONS-SCNC: 5 MMOL/L
AST SERPL-CCNC: 32 U/L (ref 9–36)
BILIRUB DIRECT SERPL-MCNC: 0 MG/DL (ref 0–0.3)
BUN SERPL-MCNC: 9 MG/DL (ref 8–23)
BUN/CREAT SERPL: 9
CHLORIDE SERPL-SCNC: 107 MMOL/L (ref 95–108)
CO2 SERPL-SCNC: 30 MMOL/L (ref 22–30)
CREAT SERPL-MCNC: 1 MG/DL (ref 0.5–1)
ERYTHROCYTE [DISTWIDTH] IN BLOOD BY AUTOMATED COUNT: 15 % (ref 11.5–15.5)
HCT VFR BLD AUTO: 27 % (ref 37–47)
HGB BLD-MCNC: 8.9 G/DL (ref 12–16)
INR PPP: 4.4 RATIO (ref 0.7–1.3)
MCH RBC QN AUTO: 33.5 PG  CALC (ref 26–32)
MCHC RBC AUTO-ENTMCNC: 33 G/DL CAL (ref 32–36)
MCV RBC AUTO: 101.5 FL  CALC (ref 80–100)
PLATELET # BLD AUTO: 147 THOU/UL (ref 130–400)
POTASSIUM SERPL-SCNC: 4.1 MMOL/L (ref 3.5–5.1)
PROT SERPL-MCNC: 5.4 G/DL (ref 6.3–8.2)
PROTHROMBIN TIME: 41 SECONDS (ref 9–12.5)
RBC # BLD AUTO: 2.66 MILL/UL (ref 4.2–5.6)
SODIUM SERPL-SCNC: 138 MMOL/L (ref 137–146)

## 2022-11-30 NOTE — NUR
RECIEVED REPORT ON PT. ASSESSED PT AT BED SIDE. IV SITE FLUSHED, PATENT. ALERT
AND ORIENTATED X3. BREATHING EVEN AND NON LABORED. PT ASSISTED TO BATHROOM. PT
DENIES ANY NEEDS AT THIS TIME. ALL SAFETY PRECAUTIONS IN PLACE AT THIS TIME.

## 2022-11-30 NOTE — NUR
PATIENT SITTING UP IN BED EATING LUNCH. TELEVISION AND LIGHTS ARE OFF WHILE
SHE IS EATING. I OFFERED TO TURN ON THE LIGHTS OR TV AND PATIENT REFUSED IT. I
OFFERED TO OPEN WINDOW BLINDS. PATIENT ALLOWED THAT. OPENED BLINDS TO ALLOW
SOME LIGHT IN. PATIENT STATES THAT SHE IS JUST TIRED TODAY. I INFORMED HER
THAT WE WOULD TRY TO ALLOW HER TO REST TODAY. PATIENT SEEMED GRATFUL. CALL
LIGHT WITHIN REACH. ADVISED TO CALL IF SHE NEEDS ANYTHING.

## 2022-11-30 NOTE — NUR
BEDSIDE REPORT RECEIVED FROM NIGHT SHIFT NURSE. PATIENT ASSESSED. AOX3. PT
RESTING IN BED WATCHING TV. DISCUSSED POC WITH PATIENT. NO QUESTIONS NOR
COMPLAINTS VOICED AT THIS TIME. ALL PERSONAL ITEMS WITHIN REACH. SAFETY
PRECAUTIONS IN PLACE. WILL CONTINUE TO MONITOR.

## 2022-11-30 NOTE — NUR
PT BP 90/54, RECHECK BP 96/58. PT ASYMPTOMATIC.  PT STATES, 'I FEEL GOOD'. PT
DENIES OTHER NEEDS AT THIS TIME.
PT
RESTING IN NO SIGN OF DISCOMFORT.  WILL CONTINUE TO MONITOR.

## 2022-12-01 VITALS — SYSTOLIC BLOOD PRESSURE: 115 MMHG | DIASTOLIC BLOOD PRESSURE: 61 MMHG

## 2022-12-01 VITALS — SYSTOLIC BLOOD PRESSURE: 101 MMHG | DIASTOLIC BLOOD PRESSURE: 47 MMHG

## 2022-12-01 VITALS — DIASTOLIC BLOOD PRESSURE: 50 MMHG | SYSTOLIC BLOOD PRESSURE: 103 MMHG

## 2022-12-01 VITALS — SYSTOLIC BLOOD PRESSURE: 100 MMHG | DIASTOLIC BLOOD PRESSURE: 42 MMHG

## 2022-12-01 VITALS — DIASTOLIC BLOOD PRESSURE: 48 MMHG | SYSTOLIC BLOOD PRESSURE: 106 MMHG

## 2022-12-01 VITALS — DIASTOLIC BLOOD PRESSURE: 48 MMHG | SYSTOLIC BLOOD PRESSURE: 101 MMHG

## 2022-12-01 VITALS — SYSTOLIC BLOOD PRESSURE: 122 MMHG | DIASTOLIC BLOOD PRESSURE: 60 MMHG

## 2022-12-01 VITALS — SYSTOLIC BLOOD PRESSURE: 103 MMHG | DIASTOLIC BLOOD PRESSURE: 50 MMHG

## 2022-12-01 VITALS — DIASTOLIC BLOOD PRESSURE: 50 MMHG | SYSTOLIC BLOOD PRESSURE: 107 MMHG

## 2022-12-01 VITALS — DIASTOLIC BLOOD PRESSURE: 48 MMHG | SYSTOLIC BLOOD PRESSURE: 100 MMHG

## 2022-12-01 LAB
APTT PPP: 41.2 SECONDS (ref 20–32.5)
INR PPP: 3.1 RATIO (ref 0.7–1.3)
INR PPP: 5.6 RATIO (ref 0.7–1.3)
PROTHROMBIN TIME: 28.9 SECONDS (ref 9–12.5)
PROTHROMBIN TIME: 51.8 SECONDS (ref 9–12.5)

## 2022-12-01 PROCEDURE — 30233K1 TRANSFUSION OF NONAUTOLOGOUS FROZEN PLASMA INTO PERIPHERAL VEIN, PERCUTANEOUS APPROACH: ICD-10-PCS | Performed by: INTERNAL MEDICINE

## 2022-12-01 NOTE — NUR
PT REMAINS TO BE SLEEPING, NO SIGNS OF RESPIRATORY DISTRESS. PT EASILY
AROUSED. PT DENIES ANY NEEDS AT THIS TIME. ALL SAFETY PRECAUTIONS IN PLACE AT
THIS TIME

## 2022-12-01 NOTE — NUR
RECEIVED REPORT FROM PEARL GABRIEL. PT SITTING ON BED LOW FOWLERS: A&O X3. EVEN AND
UNLABORED RESPIRATIONS ON [place]. TELEMETRY IN PLACE. IV SITE HEALTHY AND PATENT.
ACTIVE BOWEL SOUNDS X4 QUADRANTS. SAFETY PRECAUTIONS IN PLACE WITH CALL LIGHT
IN REACH.

## 2022-12-01 NOTE — NUR
Patient sitting in recliner with heating blanket on. States she feels great
today. Up to bathroom with standby assist.

## 2022-12-01 NOTE — NUR
PT SLEEPING. EASILY AROUSED, A/O X3. BREATHING REMAINS THE SAME. DENIES ANY
NEEDS AT THIS TIME. ALL SAFETY PRECAUTIONS IN PLACE AT THIS TIME

## 2022-12-02 VITALS — SYSTOLIC BLOOD PRESSURE: 113 MMHG | DIASTOLIC BLOOD PRESSURE: 47 MMHG

## 2022-12-02 VITALS — SYSTOLIC BLOOD PRESSURE: 99 MMHG | DIASTOLIC BLOOD PRESSURE: 45 MMHG

## 2022-12-02 VITALS — SYSTOLIC BLOOD PRESSURE: 102 MMHG | DIASTOLIC BLOOD PRESSURE: 48 MMHG

## 2022-12-02 VITALS — DIASTOLIC BLOOD PRESSURE: 50 MMHG | SYSTOLIC BLOOD PRESSURE: 99 MMHG

## 2022-12-02 VITALS — DIASTOLIC BLOOD PRESSURE: 53 MMHG | SYSTOLIC BLOOD PRESSURE: 98 MMHG

## 2022-12-02 LAB
ALBUMIN SERPL-MCNC: 2.2 G/DL (ref 3.2–5)
ALP SERPL-CCNC: 96 U/L (ref 38–126)
ANION GAP SERPL CALCULATED.3IONS-SCNC: 6 MMOL/L
AST SERPL-CCNC: 30 U/L (ref 9–36)
BUN SERPL-MCNC: 13 MG/DL (ref 8–23)
BUN/CREAT SERPL: 14
CHLORIDE SERPL-SCNC: 105 MMOL/L (ref 95–108)
CO2 SERPL-SCNC: 29 MMOL/L (ref 22–30)
CREAT SERPL-MCNC: 0.9 MG/DL (ref 0.5–1)
ERYTHROCYTE [DISTWIDTH] IN BLOOD BY AUTOMATED COUNT: 14.8 % (ref 11.5–15.5)
HCT VFR BLD AUTO: 24.1 % (ref 37–47)
HGB BLD-MCNC: 8.1 G/DL (ref 12–16)
INR PPP: 2.8 RATIO (ref 0.7–1.3)
MAGNESIUM SERPL-MCNC: 1.1 MG/DL (ref 1.6–2.3)
MCH RBC QN AUTO: 33.5 PG  CALC (ref 26–32)
MCHC RBC AUTO-ENTMCNC: 33.6 G/DL CAL (ref 32–36)
MCV RBC AUTO: 99.6 FL  CALC (ref 80–100)
PLATELET # BLD AUTO: 144 THOU/UL (ref 130–400)
POTASSIUM SERPL-SCNC: 3.8 MMOL/L (ref 3.5–5.1)
PROT SERPL-MCNC: 5.3 G/DL (ref 6.3–8.2)
PROTHROMBIN TIME: 26.6 SECONDS (ref 9–12.5)
RBC # BLD AUTO: 2.42 MILL/UL (ref 4.2–5.6)
SODIUM SERPL-SCNC: 137 MMOL/L (ref 137–146)

## 2022-12-02 PROCEDURE — B518ZZA FLUOROSCOPY OF SUPERIOR VENA CAVA, GUIDANCE: ICD-10-PCS | Performed by: RADIOLOGY

## 2022-12-02 PROCEDURE — 02HV33Z INSERTION OF INFUSION DEVICE INTO SUPERIOR VENA CAVA, PERCUTANEOUS APPROACH: ICD-10-PCS | Performed by: RADIOLOGY

## 2022-12-02 NOTE — NUR
PATIEN RESTING IN BED IN SEMI FOWLERS POSITION, AWAKE, WATCHING TV.  PAITENT
DENIES ANY DISCOMFORT OR PAIN AT THIS TIME.  BED IN LOWEST POSITION, CALL
LIGHT AND BEDSIDE TABLE WITHIN REACH.

## 2022-12-02 NOTE — NUR
RECEIVED REPORT FROM NIGHT SHIFT RN.  PATIENT RESTING IN BED IN LOW SEMI
FOLWERS POSITION WITH EYES CLOSED.  PATIENT IS A&OX3 AND ABLE TO MAKE NEEDS
KNOWN.  PATIENT DENIES ANY DISCOMFORT OR PAIN AT THIS TIME.   PT HAS TELE
MONITOR IN PLACE, MONITORED BY ED.  IV IS PATENT AND FLUSHES WELL.  PT IS IN
ROOM AIR.  ASSESSMENT COMPLETED.  CALL LIGHT AND BEDSIDE TABLE WITHIN REACH.

## 2022-12-02 NOTE — NUR
PATIENT RESTING IN BED IN LOW SEMI FOWLERS POSITION WITH EYES CLOSED.  IV IS
PATENT AND FLUSHES WELL.  NO SIGNS OF DISTRESS NOTED AT THIS TIME.  CALL LIGHT
AND BEDSIDE TABLE WITHIN REACH.  SAFETY PRECAUTIONS IN PLACE.

## 2022-12-02 NOTE — NUR
PT RESTING ON BED. WARM BLANKET PROVIDED PER PT'S REQUEST. NO DISTRESS NOTED.
PT DENIES PAIN AT THIS TIME. SAFETY PRECAUTIONS IN PLACE WITH CALL LIGHT IN
REACH.

## 2022-12-02 NOTE — NUR
PT ASSISTED TO BATHROOM. PT BACK IN BED. PT A&O X3. EVEN AND UNLABORED
RESPIRATIONS: CLEAR LUNG SOUNDS UPON AUSCULTATION. TELEMETRY IN PLACE. IV
FLUSHED: #22G RT FA, HEALTHY AND PATENT. PICCLINE TO RT UPPER ARM, HEALTHY AND
PATENT. ACTIVE BOWEL SOUNDS X4 QUADRANTS. SAFETY PRECAUTIONS IN PLACE WITH
CALL LIGHT IN REACH.

## 2022-12-02 NOTE — NUR
PT RESTING WITH EYES CLOSED. NO DISTRESS OR PAIN NOTED. TELEMETRY IN PLACE. IV
SITE HEALTHY AND PATENT. NO NEEDS AT THIS TIME. SAFETY PRECAUTIONS IN PLACE
WITH CALL LIGHT IN REACH.

## 2022-12-03 VITALS — SYSTOLIC BLOOD PRESSURE: 91 MMHG | DIASTOLIC BLOOD PRESSURE: 43 MMHG

## 2022-12-03 VITALS — DIASTOLIC BLOOD PRESSURE: 59 MMHG | SYSTOLIC BLOOD PRESSURE: 111 MMHG

## 2022-12-03 VITALS — DIASTOLIC BLOOD PRESSURE: 53 MMHG | SYSTOLIC BLOOD PRESSURE: 100 MMHG

## 2022-12-03 VITALS — SYSTOLIC BLOOD PRESSURE: 111 MMHG | DIASTOLIC BLOOD PRESSURE: 59 MMHG

## 2022-12-03 VITALS — DIASTOLIC BLOOD PRESSURE: 54 MMHG | SYSTOLIC BLOOD PRESSURE: 100 MMHG

## 2022-12-03 VITALS — SYSTOLIC BLOOD PRESSURE: 100 MMHG | DIASTOLIC BLOOD PRESSURE: 53 MMHG

## 2022-12-03 VITALS — DIASTOLIC BLOOD PRESSURE: 36 MMHG | SYSTOLIC BLOOD PRESSURE: 89 MMHG

## 2022-12-03 VITALS — SYSTOLIC BLOOD PRESSURE: 87 MMHG | DIASTOLIC BLOOD PRESSURE: 33 MMHG

## 2022-12-03 VITALS — SYSTOLIC BLOOD PRESSURE: 87 MMHG | DIASTOLIC BLOOD PRESSURE: 37 MMHG

## 2022-12-03 VITALS — SYSTOLIC BLOOD PRESSURE: 123 MMHG | DIASTOLIC BLOOD PRESSURE: 71 MMHG

## 2022-12-03 LAB
ANION GAP SERPL CALCULATED.3IONS-SCNC: 5 MMOL/L
BUN SERPL-MCNC: 15 MG/DL (ref 8–23)
BUN/CREAT SERPL: 16
CHLORIDE SERPL-SCNC: 106 MMOL/L (ref 95–108)
CO2 SERPL-SCNC: 31 MMOL/L (ref 22–30)
CREAT SERPL-MCNC: 0.9 MG/DL (ref 0.5–1)
ERYTHROCYTE [DISTWIDTH] IN BLOOD BY AUTOMATED COUNT: 14.9 % (ref 11.5–15.5)
HCT VFR BLD AUTO: 25.1 % (ref 37–47)
HGB BLD-MCNC: 8.4 G/DL (ref 12–16)
INR PPP: 2.5 RATIO (ref 0.7–1.3)
MCH RBC QN AUTO: 33.9 PG  CALC (ref 26–32)
MCHC RBC AUTO-ENTMCNC: 33.5 G/DL CAL (ref 32–36)
MCV RBC AUTO: 101.2 FL  CALC (ref 80–100)
PLATELET # BLD AUTO: 141 THOU/UL (ref 130–400)
POTASSIUM SERPL-SCNC: 3.9 MMOL/L (ref 3.5–5.1)
PROTHROMBIN TIME: 23.5 SECONDS (ref 9–12.5)
RBC # BLD AUTO: 2.48 MILL/UL (ref 4.2–5.6)
SODIUM SERPL-SCNC: 137 MMOL/L (ref 137–146)

## 2022-12-03 NOTE — NUR
PT SITTING IN RECLINER AT BEDSIDE EATING LUNCH, NO SIGNS OF DISTRESS NOTED,
RESP EVEN AND UNLABORED. CALL LIGHT IN REACH,CONTINUE TO MONITOR.

## 2022-12-03 NOTE — NUR
PT RESTING IN BED, NO SIGNS OF DISTRESS NOTED, RESP EVEN AND UNLABORED. PT
ALERT AND ORIENTED X3,DISCUSSED POC, PT DENIES ANY NEEDS OR COMPLAINTS AT
THIS TIME. ASSESSMENT COMPLETED, CALL LIGHT IN REACH,CONTINUE TO MONITOR.

## 2022-12-03 NOTE — NUR
PT RESTING WITH EYES CLOSED. NO DISTRESS OR PAIN NOTED. YVONNE HUGGER IN PLACE
PER PT'S REQUEST. SAFETY PRECAUTIONS AND BED ON LOWEST POSITION. BEDSIDE TABLE
AND CALL LIGHT IN REACH.

## 2022-12-03 NOTE — NUR
PT RESTING IN BED, IV ANTIBIOTIC INITIATED, VOICES NO NEEDS OR COMPLAINTS AT
THIS TIME, CALL LIGHT IN REACH,CONTINUE TO MONITOR.

## 2022-12-03 NOTE — NUR
PT RESTING ON BED. NO DISTRESS OR PAIN NOTED. PICC LINE FLUSHED,
HEALTHY AND PATENT. WARM COMPRESS PROVIDED PER PT'S REQUEST. REMOVED IV #22 RT
FA, CATHETER INTACT UPON REMOVAL, PT TOLERATED WELL. SAFETY PRECAUTIONS IN
PLACE WITH CALL LIGHT IN REACH.

## 2022-12-03 NOTE — NUR
RECIEVED REPORT ON PT . ASSESSED AT [facility]. BREATHING EVEN AND NON LABORED, NO
REPORTED SOB. PT STATED HAVING 3 BM TODAY. ALERT AND ORIENTATED X3. PICC LINE
FLUSHED, GOOD BLOOD RETURN.TELE IN PLACE. PT DENIES ANY NEEDS AT THIS TIME.
ALL SAFETY PRECAUTIONS IN PLACE AT THIS TIME

## 2022-12-04 VITALS — SYSTOLIC BLOOD PRESSURE: 100 MMHG | DIASTOLIC BLOOD PRESSURE: 48 MMHG

## 2022-12-04 VITALS — SYSTOLIC BLOOD PRESSURE: 85 MMHG | DIASTOLIC BLOOD PRESSURE: 43 MMHG

## 2022-12-04 VITALS — SYSTOLIC BLOOD PRESSURE: 101 MMHG | DIASTOLIC BLOOD PRESSURE: 51 MMHG

## 2022-12-04 VITALS — SYSTOLIC BLOOD PRESSURE: 104 MMHG | DIASTOLIC BLOOD PRESSURE: 53 MMHG

## 2022-12-04 VITALS — SYSTOLIC BLOOD PRESSURE: 100 MMHG | DIASTOLIC BLOOD PRESSURE: 53 MMHG

## 2022-12-04 VITALS — DIASTOLIC BLOOD PRESSURE: 50 MMHG | SYSTOLIC BLOOD PRESSURE: 93 MMHG

## 2022-12-04 LAB
ANION GAP SERPL CALCULATED.3IONS-SCNC: 6 MMOL/L
BASOPHILS NFR BLD AUTO: 1 % (ref 0–3)
BUN SERPL-MCNC: 16 MG/DL (ref 8–23)
BUN/CREAT SERPL: 16
CHLORIDE SERPL-SCNC: 108 MMOL/L (ref 95–108)
CO2 SERPL-SCNC: 28 MMOL/L (ref 22–30)
CREAT SERPL-MCNC: 1 MG/DL (ref 0.5–1)
EOSINOPHIL NFR BLD AUTO: 6 % (ref 0–8)
ERYTHROCYTE [DISTWIDTH] IN BLOOD BY AUTOMATED COUNT: 14.8 % (ref 11.5–15.5)
HCT VFR BLD AUTO: 24.1 % (ref 37–47)
HGB BLD-MCNC: 7.9 G/DL (ref 12–16)
IMM GRANULOCYTES NFR BLD: 0.2 % (ref 0–5)
INR PPP: 2.3 RATIO (ref 0.7–1.3)
LYMPHOCYTES NFR BLD: 29 % (ref 15–41)
MAGNESIUM SERPL-MCNC: 1.3 MG/DL (ref 1.6–2.3)
MCH RBC QN AUTO: 33.5 PG  CALC (ref 26–32)
MCHC RBC AUTO-ENTMCNC: 32.8 G/DL CAL (ref 32–36)
MCV RBC AUTO: 102.1 FL  CALC (ref 80–100)
MONOCYTES NFR BLD AUTO: 9 % (ref 2–13)
NEUTROPHILS # BLD AUTO: 2.4 THOU/UL (ref 2–7.15)
NEUTROPHILS NFR BLD AUTO: 56 % (ref 42–76)
PLATELET # BLD AUTO: 134 THOU/UL (ref 130–400)
POTASSIUM SERPL-SCNC: 3.6 MMOL/L (ref 3.5–5.1)
PROTHROMBIN TIME: 22.3 SECONDS (ref 9–12.5)
RBC # BLD AUTO: 2.36 MILL/UL (ref 4.2–5.6)
SODIUM SERPL-SCNC: 138 MMOL/L (ref 137–146)

## 2022-12-04 NOTE — NUR
PT ASLEEP, RESTING ON RIGHT SIDE. BEAR HUGGER IN PLACE. PT EASILY AROUSED,
SLIGHTLY CONFUSED WHEN AWOKEN, EASILY REORIENTATED. DENIES PAIN, DISCOMFORT OR
FURTHER NEEDS. ALL SAFETY PRECAUTIONS IN PLACE AT THIS TIME

## 2022-12-04 NOTE — NUR
RECIEVED REPORT ON PT.PT RESTING IN BED. PICC FLUSHED, SALINE LOCKED. PT
DENIES PAIN. BREATHING IS EVEN AND NON LABORED, NO O2 IN PLACE. PT DENIES PAIN
OR DISCOMFORT. TODAY PT SEEMS MORE TIRED AND DROWSY, IN A SLUGGISH MOOD.
DENIES ANY NEEDS AT THIS TIME. ALL SAFETY PRECAUTIONS IN PLACE AT THIS TIME

## 2022-12-04 NOTE — NUR
PT SLEEPING. BREATHING REMAINS EVEN AND NONLABORED. PT DENIES ANY NEEDS AT
THIS TIME. ALL SAFETY PRECAUTIONS IN PLACE AT THIS TIME

## 2022-12-04 NOTE — NUR
PATIENT IS AWAKE IN BED WATCHING TV. DENIES PAIN AT THIS TIME, AND NO
IMMEDIATE NEEDS. VITAL SIGNS ARE STABLE AND SAFETY MEASURES ARE IN PLACE

## 2022-12-04 NOTE — NUR
Patient is alert and orientated. SR on Monitor with heart rate at 77. On RA.
Vital signs stable.
Denies pain at this time. Denies any additional needs at this time
Saftey measures are in place.

## 2022-12-05 VITALS — SYSTOLIC BLOOD PRESSURE: 96 MMHG | DIASTOLIC BLOOD PRESSURE: 54 MMHG

## 2022-12-05 VITALS — DIASTOLIC BLOOD PRESSURE: 59 MMHG | SYSTOLIC BLOOD PRESSURE: 131 MMHG

## 2022-12-05 VITALS — DIASTOLIC BLOOD PRESSURE: 55 MMHG | SYSTOLIC BLOOD PRESSURE: 97 MMHG

## 2022-12-05 LAB
INR PPP: 2.2 RATIO (ref 0.7–1.3)
PROTHROMBIN TIME: 20.9 SECONDS (ref 9–12.5)

## 2022-12-05 NOTE — NUR
PT IN BED. EASILY AROUSED. OBTAINED BLOOD DRAW VIA PICC. HERPARIN LOCKED. PT
DENIES ANY NEEDS. ALL SAFETY PRECAUTIONS IN PLACE AT THIS TIME

## 2022-12-05 NOTE — NUR
PT RESTING IN ED WATCHING TV. ASSESSMENT COMPLETED.
UPDATED PT ON CURRENT PLAN OF CARE. PT INDICATED UNDERSTANDING, STATES NO
PAIN. TELE MONITOR IN PLACE, CONTINOUS MONITORING PER ED.
FALL/SAFTEY PRECAUTION IN PLACE, CALL LIGHT WITHIN REACH

## 2022-12-05 NOTE — NUR
Discharge instructions given. Patient verbalizes understanding of same.
Discharged in stable condition via Wheelchair to [facility] with
[facility]. All belongings sent with pt.

## 2022-12-05 NOTE — NUR
PT RESTING EATING LUNCH. NO DISTRESS NOTED.
STATES NO NEEDS AT THIS TIME. FALL/SAFTEY PRECAUTION IN PLACE, CALL LIGHT
WITHINR REACH